# Patient Record
Sex: FEMALE | Race: WHITE | Employment: FULL TIME | URBAN - METROPOLITAN AREA
[De-identification: names, ages, dates, MRNs, and addresses within clinical notes are randomized per-mention and may not be internally consistent; named-entity substitution may affect disease eponyms.]

---

## 2017-01-09 DIAGNOSIS — E16.2 HYPOGLYCEMIA: Primary | ICD-10-CM

## 2017-01-11 ENCOUNTER — TELEPHONE (OUTPATIENT)
Dept: ENDOCRINOLOGY | Age: 57
End: 2017-01-11

## 2017-01-11 ENCOUNTER — HOSPITAL ENCOUNTER (OUTPATIENT)
Dept: OTHER | Age: 57
Discharge: HOME | End: 2017-01-11
Attending: INTERNAL MEDICINE | Admitting: INTERNAL MEDICINE

## 2022-03-08 PROBLEM — K21.9 ACID REFLUX: Status: ACTIVE | Noted: 2022-03-08

## 2022-03-08 PROBLEM — E03.9 HYPOACTIVE THYROID: Status: ACTIVE | Noted: 2022-03-08

## 2022-03-08 PROBLEM — I48.91 A-FIB (HCC): Status: ACTIVE | Noted: 2022-03-08

## 2022-03-08 PROBLEM — F41.9 ANXIETY: Status: ACTIVE | Noted: 2022-03-08

## 2022-03-08 NOTE — PROGRESS NOTES
Patient ID:   Alex Daniels is a 57 y.o. female    Chief Complaint:   Alex Daniels is seen for initial evaluation of hypoglycemia     Referred by Ritesh Mcclure CNP     Subjective:   Alex Daniels noticed a reading of 39 on her  meter     She took prednisone last year     She started to have low sugars for a more than 10 years     It was 37 this past Sunday . She checks blood sugar on her husbands meter     Hypoglycemias happen randomly     She had gastric bypass 2002.  She lost 100 lbs and regained 20 lbs     She has pacemaker     She takes levothyroxine 50 mcg in am     No pituitary pathology or head trauma  Denies alcohol intake    History of thyroid /autoimmune disorders in the family : None   No new medicine added recently     The following portions of the patient's history were reviewed and updated as appropriate:      Family History   Problem Relation Age of Onset    Cancer Mother         lung    Diabetes Mother     High Blood Pressure Mother     Heart Disease Mother     Anxiety Disorder Mother     Diabetes Maternal Grandmother     Heart Disease Maternal Grandmother     High Blood Pressure Maternal Grandmother     Stroke Maternal Grandmother     Diabetes Maternal Grandfather     Heart Disease Maternal Grandfather     High Blood Pressure Maternal Grandfather     Heart Attack Maternal Grandfather          Social History     Socioeconomic History    Marital status:      Spouse name: Not on file    Number of children: Not on file    Years of education: Not on file    Highest education level: Not on file   Occupational History    Not on file   Tobacco Use    Smoking status: Never Smoker    Smokeless tobacco: Never Used   Vaping Use    Vaping Use: Never used   Substance and Sexual Activity    Alcohol use: Yes     Comment: ON THE WEEKENDS    Drug use: No    Sexual activity: Not on file   Other Topics Concern    Not on file   Social History Narrative    Not on file Social Determinants of Health     Financial Resource Strain:     Difficulty of Paying Living Expenses: Not on file   Food Insecurity:     Worried About Running Out of Food in the Last Year: Not on file    Chiara of Food in the Last Year: Not on file   Transportation Needs:     Lack of Transportation (Medical): Not on file    Lack of Transportation (Non-Medical): Not on file   Physical Activity:     Days of Exercise per Week: Not on file    Minutes of Exercise per Session: Not on file   Stress:     Feeling of Stress : Not on file   Social Connections:     Frequency of Communication with Friends and Family: Not on file    Frequency of Social Gatherings with Friends and Family: Not on file    Attends Mandaeism Services: Not on file    Active Member of [address] [place] [address] [employer] or Organizations: Not on file    Attends Club or Organization Meetings: Not on file    Marital Status: Not on file   Intimate Partner Violence:     Fear of Current or Ex-Partner: Not on file    Emotionally Abused: Not on file    Physically Abused: Not on file    Sexually Abused: Not on file   Housing Stability:     Unable to Pay for Housing in the Last Year: Not on file    Number of [place] in the Last Year: Not on file    Unstable Housing in the Last Year: Not on file         Past Medical History:   Diagnosis Date    Anxiety     Arthritic-like pain          Past Surgical History:   Procedure Laterality Date    CARPAL TUNNEL RELEASE      CHOLECYSTECTOMY      ENDOMETRIAL ABLATION      GASTRIC BYPASS SURGERY      [address]      right shoulder    TENDON RELEASE           Allergies   Allergen Reactions    Cortisone      Other reaction(s): Sweating  Pt had sweating and flushing from cortisone injection . 5cc of betasone and .5cc lidocaine Rt trigger thumb    Nsaids Other (See Comments)     ulcer    Tolmetin      Other reaction(s):  Other (See Comments), Other (See Comments)  ulcer  ulcer  ulcer      Zaroxolyn [Metolazone] Other (See Comments)     ulcer         Current Outpatient Medications:     metoprolol succinate (TOPROL XL) 50 MG extended release tablet, Take 50 mg by mouth daily, Disp: , Rfl:     nortriptyline (PAMELOR) 10 MG capsule, nortriptyline 10 mg capsule, Disp: , Rfl:     esomeprazole (NEXIUM) 20 MG delayed release capsule, Take 20 mg by mouth every morning (before breakfast), Disp: , Rfl:     gabapentin (NEURONTIN) 300 MG capsule, Take 300 mg by mouth nightly., Disp: , Rfl:     pravastatin (PRAVACHOL) 20 MG tablet, Take 20 mg by mouth daily, Disp: , Rfl:     levothyroxine (SYNTHROID) 50 MCG tablet, Take 50 mcg by mouth Daily, Disp: , Rfl:     DULoxetine (CYMBALTA) 30 MG extended release capsule, Take 60 mg by mouth daily , Disp: , Rfl:     buPROPion (WELLBUTRIN XL) 300 MG extended release tablet, Take 150 mg by mouth every morning , Disp: , Rfl:     furosemide (LASIX) 40 MG tablet, Take 40 mg by mouth daily , Disp: , Rfl:     Lancet Device MISC, 1 Device by Does not apply route once, Disp: , Rfl:     Glucose Blood (BLOOD GLUCOSE TEST STRIPS) STRP, by In Vitro route, Disp: , Rfl:     Review of Systems:    Constitutional: Negative for fever, chills, and unexpected weight change. HENT: Negative for congestion, ear pain, rhinorrhea,  sore throat and trouble swallowing. Eyes: Negative for photophobia, redness, itching. Respiratory: Negative for cough, shortness of breath and sputum. Cardiovascular: Negative for chest pain, palpitations and leg swelling. Gastrointestinal: Negative for nausea, vomiting, abdominal pain, diarrhea, constipation. Endocrine: Negative for cold intolerance, heat intolerance, polydipsia, polyphagia and polyuria. Genitourinary: Negative for dysuria, urgency, frequency, hematuria and flank pain. Musculoskeletal: Negative for myalgias, back pain, arthralgias and neck pain. Skin/Nail: Negative for rash, itching. Normal nails.    Neurological: Negative for seizures, weakness, light-headedness, numbness and headaches. Hematological/ Lymph nodes: Negative for adenopathy. Does not bruise/bleed easily. Psychiatric/Behavioral: Negative for suicidal ideas, depression, anxiety, sleep disturbance and decreased concentration. Objective:   Physical Exam:    /88 (Site: Left Upper Arm, Position: Sitting, Cuff Size: Large Adult)   Pulse 80   Ht 5' 5\" (1.651 m)   Wt 256 lb (116.1 kg)   SpO2 99%   BMI 42.60 kg/m²       Constitutional: Patient is oriented to person, place, and time. Patient appears well-developed and well-nourished. HENT:    Head: Normocephalic and atraumatic. Eyes: Conjunctivae and EOM are normal. Pupils are equal, round, and reactive to light. Neck: Normal range of motion. Cardiovascular: Normal rate, regular rhythm and normal heart sounds. Pulmonary/Chest: Effort normal and breath sounds normal.   Abdominal: Soft. Bowel sounds are normal.   Musculoskeletal: Normal range of motion. Neurological: Patient is alert and oriented to person, place, and time. Patient has normal reflexes. Skin: Skin is warm and dry. Psychiatric: Patient has a normal mood and affect. Patient behavior is normal.     Lab Review:      No visits with results within 1 Year(s) from this visit. Latest known visit with results is:   No results found for any previous visit. Assessment and Plan       Renny Rock was seen today for consultation and thyroid problem. Diagnoses and all orders for this visit:    Hypoglycemia  -     Hemoglobin A1C; Future  -     Comprehensive Metabolic Panel; Future  -     TSH; Future  -     T4, Free; Future  -     T3, Free; Future  -     Cortisol AM, Total; Future  -     ACTH; Future  -     Metanephrines Plasma Free; Future    Acquired hypothyroidism  -     Hemoglobin A1C; Future  -     Comprehensive Metabolic Panel; Future  -     TSH;  Future  -     T4, Free; Future  -     T3, Free; Future  -     Cortisol AM, Total; Future  - ACTH; Future  -     Metanephrines Plasma Free; Future    Status post bariatric surgery  -     Hemoglobin A1C; Future  -     Comprehensive Metabolic Panel; Future  -     TSH; Future  -     T4, Free; Future  -     T3, Free; Future  -     Cortisol AM, Total; Future  -     ACTH;  Future  -     Metanephrines Plasma Free; Future        1: Hypoglycemia   H/p bariatric surgery     TSH, Ft4, Ft3, A1C, CMP and cortisol levels   Check metanephrines     Check blood sugars frequently also with symptoms suggestive of hypoglycemia   Keep a food diary  Will  professional CGM     Avoid simples   Have small frequent meals with complex carbs   Avoid juices     2: Hypothyroidism   Check levels     Drop food diary and sensor in 10-14 days   RTC prn       Electronically signed by Corinne Ibrahim MD on 3/9/2022 at 3:20 PM

## 2022-03-09 ENCOUNTER — OFFICE VISIT (OUTPATIENT)
Dept: ENDOCRINOLOGY | Age: 62
End: 2022-03-09
Payer: COMMERCIAL

## 2022-03-09 VITALS
SYSTOLIC BLOOD PRESSURE: 130 MMHG | WEIGHT: 256 LBS | HEIGHT: 65 IN | DIASTOLIC BLOOD PRESSURE: 88 MMHG | HEART RATE: 80 BPM | BODY MASS INDEX: 42.65 KG/M2 | OXYGEN SATURATION: 99 %

## 2022-03-09 DIAGNOSIS — E03.9 ACQUIRED HYPOTHYROIDISM: ICD-10-CM

## 2022-03-09 DIAGNOSIS — E16.2 HYPOGLYCEMIA: Primary | ICD-10-CM

## 2022-03-09 DIAGNOSIS — Z98.84 STATUS POST BARIATRIC SURGERY: ICD-10-CM

## 2022-03-09 PROCEDURE — G8417 CALC BMI ABV UP PARAM F/U: HCPCS | Performed by: INTERNAL MEDICINE

## 2022-03-09 PROCEDURE — G8427 DOCREV CUR MEDS BY ELIG CLIN: HCPCS | Performed by: INTERNAL MEDICINE

## 2022-03-09 PROCEDURE — 99204 OFFICE O/P NEW MOD 45 MIN: CPT | Performed by: INTERNAL MEDICINE

## 2022-03-09 PROCEDURE — G8484 FLU IMMUNIZE NO ADMIN: HCPCS | Performed by: INTERNAL MEDICINE

## 2022-03-09 RX ORDER — METOPROLOL SUCCINATE 50 MG/1
50 TABLET, EXTENDED RELEASE ORAL DAILY
COMMUNITY

## 2022-03-09 RX ORDER — NORTRIPTYLINE HYDROCHLORIDE 10 MG/1
CAPSULE ORAL
COMMUNITY
Start: 2021-03-21

## 2022-03-09 RX ORDER — GABAPENTIN 300 MG/1
300 CAPSULE ORAL NIGHTLY
COMMUNITY

## 2022-03-14 ENCOUNTER — TELEPHONE (OUTPATIENT)
Dept: ENDOCRINOLOGY | Age: 62
End: 2022-03-14

## 2022-03-14 NOTE — TELEPHONE ENCOUNTER
Patient has Dexcom Pro placed last Wednesday and fell off Friday night. Please call and advise patient on what to do next.

## 2022-03-14 NOTE — TELEPHONE ENCOUNTER
Left a VM message to contact the office. She can come into the office to have Dexcom pro placed  Please have her speak with April to schedule nurse visit.

## 2022-03-15 ENCOUNTER — NURSE ONLY (OUTPATIENT)
Dept: ENDOCRINOLOGY | Age: 62
End: 2022-03-15

## 2022-03-15 DIAGNOSIS — E16.2 HYPOGLYCEMIA: Primary | ICD-10-CM

## 2022-03-25 ENCOUNTER — NURSE ONLY (OUTPATIENT)
Dept: ENDOCRINOLOGY | Age: 62
End: 2022-03-25

## 2022-03-25 DIAGNOSIS — E16.2 HYPOGLYCEMIA: Primary | ICD-10-CM

## 2022-03-25 NOTE — PROGRESS NOTES
Removed Dexcom Pro. Dr Aubrey Bunn MA is out today, she will download for the doctor to review when she returns.

## 2022-03-31 ENCOUNTER — PROCEDURE VISIT (OUTPATIENT)
Dept: ENDOCRINOLOGY | Age: 62
End: 2022-03-31
Payer: COMMERCIAL

## 2022-03-31 DIAGNOSIS — E16.2 HYPOGLYCEMIA: Primary | ICD-10-CM

## 2022-03-31 PROCEDURE — 95250 CONT GLUC MNTR PHYS/QHP EQP: CPT | Performed by: INTERNAL MEDICINE

## 2022-03-31 NOTE — PROGRESS NOTES
Professional Continuous Glucose Monitoring:      I have reviewed the CGMS readings for  Luan Rodríguez who is 57 y.o. female    She wore the CGMS monitor from March 15th to March 25th 2022     Meal intake diary were reviewed. Following observations were noted:    Average glucose: 111    Above target % <1  In target range: 98%   Below 70 (hypoglycemias): <1%     Blood sugars were high after after high carb diet and then dropped to low 70's or even 60's     There was no documented exercise. Review of meals showed diet was good with occasional high meals. Recommendations: To avoid hypoglycemias, avoid high carb diet   Keep carbs under 45 grams for each meal   Use low carb, whole grain carbs   Have small meals every 3-4 hours   Please notify any unusual glycemic excursions. Pt was called and the results/recommendation were discussed. She verbalized all the new changes.       Electronically signed by Carmen Polo MD on 3/31/2022 at 6:18 PM
NAD, VSS, Afebrile, + PERRL with full EOMS, No spinal midline tender, + Left upper para thoracic and lower para lumbar tender without swelling or lesion. No RIB or CVA tender, Lungs clear, ABD soft, non tender. No pelvic or hip tender. Full ROMs of joints without tenderness. Neuro- intact.

## 2022-04-04 ENCOUNTER — TELEPHONE (OUTPATIENT)
Dept: ENDOCRINOLOGY | Age: 62
End: 2022-04-04

## 2022-04-04 NOTE — TELEPHONE ENCOUNTER
Per [facility] last time pt was there was early Feb which no lab work was done. They have nothing on file. I called Mrs. Abdoulaye Arthur had to leave her a message to contact the office.

## 2022-04-04 NOTE — TELEPHONE ENCOUNTER
----- Message from Reyna Franco MD sent at 3/31/2022  6:16 PM EDT -----  She did labs 10 days ago at [facility]   Please check on Monday   Thanks

## 2022-04-05 NOTE — TELEPHONE ENCOUNTER
Mrs. [facility] stated she had the lab work done at the [facility]. Inna Shahid will fax the results today.

## 2022-04-06 NOTE — TELEPHONE ENCOUNTER
[facility] informed we have yet to receive her lab results. She will reach out to the lab again tomorrow.

## 2022-04-06 NOTE — TELEPHONE ENCOUNTER
Left a message for Mrs. Daniel Dunlap to contact the office. The results received are for lab work not ordered by Dr. Marie Juares. Did she completed the specialty lab work he ordered?

## 2022-04-06 NOTE — TELEPHONE ENCOUNTER
Pt called back and I gave her the message that's in the chart from the nurse and she states she would still like a call back from the nurse because she thought we had rec'd the labs    # [phone]

## 2022-04-26 DIAGNOSIS — E27.40 ADRENAL INSUFFICIENCY (HCC): Primary | ICD-10-CM

## 2022-04-26 RX ORDER — HYDROCORTISONE 10 MG/1
10 TABLET ORAL
Qty: 30 TABLET | Refills: 3 | Status: SHIPPED | OUTPATIENT
Start: 2022-04-26

## 2022-04-26 NOTE — PROGRESS NOTES
Labs discussed   Metanephrines normal , CMP good   A1c 6% , prediabetes     ACTH 7.1, am cortisol 5.9. both low - April 2022     Will ct head as she has pacemaker     Start hydrocortisone 10 mg daily     Follow up in 2-3 months

## 2022-05-06 ENCOUNTER — HOSPITAL ENCOUNTER (OUTPATIENT)
Dept: CT IMAGING | Age: 62
Discharge: HOME | End: 2022-05-06
Payer: COMMERCIAL

## 2022-05-06 DIAGNOSIS — E27.40 ADRENAL INSUFFICIENCY (HCC): ICD-10-CM

## 2022-05-06 LAB
GFR AFRICAN AMERICAN: >60
GFR NON-AFRICAN AMERICAN: >60
PERFORMED ON: NORMAL
POC CREATININE: 0.7 MG/DL (ref 0.6–1.2)
POC SAMPLE TYPE: NORMAL

## 2022-05-06 PROCEDURE — 70470 CT HEAD/BRAIN W/O & W/DYE: CPT

## 2022-05-06 PROCEDURE — 82565 ASSAY OF CREATININE: CPT

## 2022-05-06 PROCEDURE — 6360000004 HC RX CONTRAST MEDICATION: Performed by: INTERNAL MEDICINE

## 2022-05-06 RX ADMIN — IOPAMIDOL 80 ML: 755 INJECTION, SOLUTION INTRAVENOUS at 17:20

## 2022-05-10 ENCOUNTER — TELEPHONE (OUTPATIENT)
Dept: ENDOCRINOLOGY | Age: 62
End: 2022-05-10

## 2022-05-10 NOTE — TELEPHONE ENCOUNTER
----- Message from Trsitan Richards MD sent at 5/9/2022  6:03 PM EDT -----  Please inform CT head is normal

## 2022-08-29 ENCOUNTER — TELEPHONE (OUTPATIENT)
Dept: ENDOCRINOLOGY | Age: 62
End: 2022-08-29

## 2022-08-29 NOTE — TELEPHONE ENCOUNTER
Nisha Tyler from [address] [place] [address] called to inform ROSALIE Rodriguez 1 per Esme Pennington NP instructions that the patient recently had Covid and she had 1 episode of Hypoglycemia. BS went dropped down to 36 after she ate lunch.  For lunch she had a Bratwurst sandwich with lettuce/tomato and a she drank a gatorade zero    Nisha Tyler stated if you have any further questions please give them a call back at [phone]    Please advise

## 2024-07-11 ENCOUNTER — OFFICE VISIT (OUTPATIENT)
Dept: ENDOCRINOLOGY | Age: 64
End: 2024-07-11
Payer: COMMERCIAL

## 2024-07-11 VITALS
WEIGHT: 255.2 LBS | DIASTOLIC BLOOD PRESSURE: 86 MMHG | HEIGHT: 64 IN | HEART RATE: 82 BPM | BODY MASS INDEX: 43.57 KG/M2 | SYSTOLIC BLOOD PRESSURE: 138 MMHG | OXYGEN SATURATION: 98 %

## 2024-07-11 DIAGNOSIS — E55.9 VITAMIN D DEFICIENCY: ICD-10-CM

## 2024-07-11 DIAGNOSIS — M81.0 OSTEOPOROSIS, POSTMENOPAUSAL: Primary | ICD-10-CM

## 2024-07-11 DIAGNOSIS — Z98.84 S/P BARIATRIC SURGERY: ICD-10-CM

## 2024-07-11 DIAGNOSIS — Z82.62 FAMILY HISTORY OF OSTEOPOROSIS: ICD-10-CM

## 2024-07-11 PROCEDURE — G8427 DOCREV CUR MEDS BY ELIG CLIN: HCPCS | Performed by: INTERNAL MEDICINE

## 2024-07-11 PROCEDURE — 99214 OFFICE O/P EST MOD 30 MIN: CPT | Performed by: INTERNAL MEDICINE

## 2024-07-11 PROCEDURE — G8400 PT W/DXA NO RESULTS DOC: HCPCS | Performed by: INTERNAL MEDICINE

## 2024-07-11 PROCEDURE — 1090F PRES/ABSN URINE INCON ASSESS: CPT | Performed by: INTERNAL MEDICINE

## 2024-07-11 PROCEDURE — 3017F COLORECTAL CA SCREEN DOC REV: CPT | Performed by: INTERNAL MEDICINE

## 2024-07-11 PROCEDURE — 1036F TOBACCO NON-USER: CPT | Performed by: INTERNAL MEDICINE

## 2024-07-11 PROCEDURE — 1123F ACP DISCUSS/DSCN MKR DOCD: CPT | Performed by: INTERNAL MEDICINE

## 2024-07-11 PROCEDURE — G8417 CALC BMI ABV UP PARAM F/U: HCPCS | Performed by: INTERNAL MEDICINE

## 2024-07-11 PROCEDURE — 3079F DIAST BP 80-89 MM HG: CPT | Performed by: INTERNAL MEDICINE

## 2024-07-11 PROCEDURE — 3075F SYST BP GE 130 - 139MM HG: CPT | Performed by: INTERNAL MEDICINE

## 2024-07-11 RX ORDER — GABAPENTIN 100 MG/1
200 CAPSULE ORAL 2 TIMES DAILY
COMMUNITY

## 2024-07-11 NOTE — PATIENT INSTRUCTIONS
I will recommend daily aerobic weight bearing exercise as tolerated.  As a general rule, avoid any activities that require forward bending of the spine. Use the knees for any lifting. Beware to keep back straight during cough or sneeze.      FALL PREVENTION INSTRUCTIONS GIVEN:  -Avoid throw rugs, and floor clutter (especially around bed)  -Use nightlights and visual aids when getting up at night   -Use non-skid surface for rugs in bathrooms and near sinks  -Use non-skid pads for bathtub/shower

## 2024-07-11 NOTE — PROGRESS NOTES
Patient ID:   Anjali Ceron is a 64 y.o. female    Chief Complaint:   Anjali Ceron is seen for an evaluation of osteoporosis.     Referred by Evon Cowan CNP     Subjective:   Anjali Ceron noticed a reading of 39 on her  meter     She took prednisone last year     She started to have low sugars for a more than 10 years     Interval history:     She initially seen in March 2022. Multiple CXs  Second OV July 2024 for osteoporosis.     Jan 2024 at [facility]: DXA scan showed T scores -2.5 at left femoral neck, -1.8 at left hip, , -2.2 at right femoral neck , -1.7 at right hip, -0.1 at LS.     Fam h/o osteoporosis or hip fracture: mother had osteoporosis     Fractures : None     Has balance issues     Occasional yogurt, ice cream once a week  Calcium 1200 mg daily.   Vit D : none   MVT centrum silver     Heart burn , taking nexium     She had gastric bypass 2002     Hypothyroidism as per pcp     FOV:   Hypoglycemias happen randomly     She has pacemaker     She takes levothyroxine 50 mcg in am     No pituitary pathology or head trauma  Denies alcohol intake    History of thyroid /autoimmune disorders in the family : None   No new medicine added recently     The following portions of the patient's history were reviewed and updated as appropriate:      Family History   Problem Relation Age of Onset    Cancer Mother         lung    Diabetes Mother     High Blood Pressure Mother     Heart Disease Mother     Anxiety Disorder Mother     Diabetes Maternal Grandmother     Heart Disease Maternal Grandmother     High Blood Pressure Maternal Grandmother     Stroke Maternal Grandmother     Diabetes Maternal Grandfather     Heart Disease Maternal Grandfather     High Blood Pressure Maternal Grandfather     Heart Attack Maternal Grandfather            Social History     Socioeconomic History    Marital status:      Spouse name: Not on file    Number of children: Not on file    Years of education: Not on

## 2024-07-16 ENCOUNTER — TELEPHONE (OUTPATIENT)
Dept: ENDOCRINOLOGY | Age: 64
End: 2024-07-16

## 2024-07-16 DIAGNOSIS — M81.0 OSTEOPOROSIS, POSTMENOPAUSAL: Primary | ICD-10-CM

## 2024-07-16 RX ORDER — ZOLEDRONIC ACID 5 MG/100ML
5 INJECTION, SOLUTION INTRAVENOUS ONCE
Qty: 100 ML | Refills: 0 | Status: SHIPPED | OUTPATIENT
Start: 2024-07-16 | End: 2024-07-16

## 2024-07-16 NOTE — TELEPHONE ENCOUNTER
Per [facility] we will need to do a PA if one is necessary. Patient instructed to call insurance company and asking about a PA. If one is needs we can initiate.     [facility] will also need demo sheet, copy of insurance card, and copy of approval if needed. Fax to [phone].

## 2024-07-16 NOTE — TELEPHONE ENCOUNTER
Vit D is 34.3   Please inform Vit d is low normal. Start Vit D 1,000 units daily   She can schedule for reclast. I am printing a rx. She can schedule it at her preferred infusion center in [facility].

## 2024-08-05 ENCOUNTER — TELEPHONE (OUTPATIENT)
Dept: ENDOCRINOLOGY | Age: 64
End: 2024-08-05

## 2024-08-05 NOTE — TELEPHONE ENCOUNTER
Call from pt stating that she would like to speak with April regarding her Reclast     Pt stated that she needs a PA but would like to speak with April     Please advise   CB# [phone]

## 2024-08-06 NOTE — TELEPHONE ENCOUNTER
Christel Osmany informed PA will need to completed by [facility] as this is where she is having the reclast done.

## 2024-10-14 ENCOUNTER — TELEPHONE (OUTPATIENT)
Dept: ENDOCRINOLOGY | Age: 64
End: 2024-10-14

## 2024-10-18 ENCOUNTER — OFFICE VISIT (OUTPATIENT)
Dept: ENDOCRINOLOGY | Age: 64
End: 2024-10-18

## 2024-10-18 VITALS
HEART RATE: 83 BPM | SYSTOLIC BLOOD PRESSURE: 140 MMHG | HEIGHT: 64 IN | DIASTOLIC BLOOD PRESSURE: 82 MMHG | OXYGEN SATURATION: 98 % | WEIGHT: 258.2 LBS | BODY MASS INDEX: 44.08 KG/M2

## 2024-10-18 DIAGNOSIS — Z82.62 FAMILY HISTORY OF OSTEOPOROSIS: ICD-10-CM

## 2024-10-18 DIAGNOSIS — E55.9 VITAMIN D DEFICIENCY: ICD-10-CM

## 2024-10-18 DIAGNOSIS — M81.0 OSTEOPOROSIS, POSTMENOPAUSAL: Primary | ICD-10-CM

## 2024-10-18 DIAGNOSIS — Z98.84 S/P BARIATRIC SURGERY: ICD-10-CM

## 2024-10-18 RX ORDER — CALCIUM CARBONATE/VITAMIN D3 600 MG-10
1 TABLET ORAL DAILY
COMMUNITY

## 2024-10-18 RX ORDER — ATORVASTATIN CALCIUM 40 MG/1
1 TABLET, FILM COATED ORAL DAILY
COMMUNITY

## 2024-10-18 RX ORDER — RIMEGEPANT SULFATE 75 MG/75MG
75 TABLET, ORALLY DISINTEGRATING ORAL PRN
COMMUNITY
Start: 2024-09-06

## 2024-10-18 RX ORDER — BUDESONIDE 180 UG/1
1 AEROSOL, POWDER RESPIRATORY (INHALATION) 2 TIMES DAILY
COMMUNITY

## 2024-10-18 RX ORDER — AMLODIPINE BESYLATE 5 MG/1
TABLET ORAL
COMMUNITY

## 2024-10-18 RX ORDER — CYCLOBENZAPRINE HCL 5 MG
10 TABLET ORAL NIGHTLY
COMMUNITY
Start: 2024-07-24

## 2024-10-18 RX ORDER — VALACYCLOVIR HYDROCHLORIDE 1 G/1
1000 TABLET, FILM COATED ORAL 2 TIMES DAILY PRN
COMMUNITY
Start: 2024-09-11

## 2024-10-18 RX ORDER — EPINEPHRINE 0.3 MG/.3ML
INJECTION SUBCUTANEOUS
COMMUNITY

## 2024-10-18 NOTE — PROGRESS NOTES
Patient ID:   Anjali Ceron is a 64 y.o. female    Chief Complaint:   Anjali Ceron is seen for an evaluation of osteoporosis.     Referred by Evon Cowan CNP     Subjective:   Anjali Ceron noticed a reading of 39 on her  meter     She took prednisone last year     She started to have low sugars for a more than 10 years       Interval history:     She was initially seen in March 2022. Multiple CXs  Second OV July 2024 for osteoporosis.     Jan 2024 at [facility]: DXA scan showed T scores -2.5 at left femoral neck, -1.8 at left hip, , -2.2 at right femoral neck , -1.7 at right hip, -0.1 at LS.     Received Reclast infusion on 8/19/2024 at [facility].  Tolerated well       Fam h/o osteoporosis or hip fracture: mother had osteoporosis     Fractures : None     Has balance issues     She had gastric bypass 2002   Occasional yogurt, ice cream once a week  Calcium 1200 mg daily.   Vit D : 1,000 unit daily   MVT centrum silver     Heart burn , taking nexium     Hypothyroidism as per pcp     FOV:   Hypoglycemias happen randomly     She has pacemaker     She takes levothyroxine 50 mcg in am     No pituitary pathology or head trauma  Denies alcohol intake    History of thyroid /autoimmune disorders in the family : None   No new medicine added recently     The following portions of the patient's history were reviewed and updated as appropriate:      Family History   Problem Relation Age of Onset    Cancer Mother         lung    Diabetes Mother     High Blood Pressure Mother     Heart Disease Mother     Anxiety Disorder Mother     Diabetes Maternal Grandmother     Heart Disease Maternal Grandmother     High Blood Pressure Maternal Grandmother     Stroke Maternal Grandmother     Diabetes Maternal Grandfather     Heart Disease Maternal Grandfather     High Blood Pressure Maternal Grandfather     Heart Attack Maternal Grandfather            Social History     Socioeconomic History

## 2025-07-18 ENCOUNTER — OFFICE VISIT (OUTPATIENT)
Dept: ENDOCRINOLOGY | Age: 65
End: 2025-07-18
Payer: MEDICARE

## 2025-07-18 VITALS
OXYGEN SATURATION: 98 % | DIASTOLIC BLOOD PRESSURE: 81 MMHG | BODY MASS INDEX: 43.54 KG/M2 | HEART RATE: 82 BPM | WEIGHT: 255 LBS | SYSTOLIC BLOOD PRESSURE: 140 MMHG | HEIGHT: 64 IN

## 2025-07-18 DIAGNOSIS — M81.0 OSTEOPOROSIS, POSTMENOPAUSAL: Primary | ICD-10-CM

## 2025-07-18 DIAGNOSIS — E55.9 VITAMIN D DEFICIENCY: ICD-10-CM

## 2025-07-18 DIAGNOSIS — Z98.84 S/P BARIATRIC SURGERY: ICD-10-CM

## 2025-07-18 PROCEDURE — 1123F ACP DISCUSS/DSCN MKR DOCD: CPT | Performed by: INTERNAL MEDICINE

## 2025-07-18 PROCEDURE — 1160F RVW MEDS BY RX/DR IN RCRD: CPT | Performed by: INTERNAL MEDICINE

## 2025-07-18 PROCEDURE — G2211 COMPLEX E/M VISIT ADD ON: HCPCS | Performed by: INTERNAL MEDICINE

## 2025-07-18 PROCEDURE — 1159F MED LIST DOCD IN RCRD: CPT | Performed by: INTERNAL MEDICINE

## 2025-07-18 PROCEDURE — 3077F SYST BP >= 140 MM HG: CPT | Performed by: INTERNAL MEDICINE

## 2025-07-18 PROCEDURE — 3079F DIAST BP 80-89 MM HG: CPT | Performed by: INTERNAL MEDICINE

## 2025-07-18 PROCEDURE — 99214 OFFICE O/P EST MOD 30 MIN: CPT | Performed by: INTERNAL MEDICINE

## 2025-07-18 RX ORDER — ERGOCALCIFEROL 1.25 MG/1
50000 CAPSULE ORAL WEEKLY
Qty: 13 CAPSULE | Refills: 1 | Status: SHIPPED | OUTPATIENT
Start: 2025-07-18 | End: 2026-01-14

## 2025-07-18 RX ORDER — BUTALBITAL, ACETAMINOPHEN AND CAFFEINE 50; 325; 40 MG/1; MG/1; MG/1
1 TABLET ORAL EVERY 4 HOURS PRN
COMMUNITY
Start: 2025-06-30

## 2025-07-18 RX ORDER — FUROSEMIDE 20 MG/1
20 TABLET ORAL DAILY
COMMUNITY
Start: 2025-05-23

## 2025-07-18 NOTE — PROGRESS NOTES
symptoms could be associated with atypical fractures of femur which have been rarely reported in patients taking long term bisphosphonate therapy for Osteoporosis. Patient verbalized understanding of the above.           Electronically signed by IVÁN FINN MD on 7/18/2025 at 10:56 AM